# Patient Record
Sex: FEMALE | Race: WHITE | NOT HISPANIC OR LATINO | Employment: FULL TIME | URBAN - METROPOLITAN AREA
[De-identification: names, ages, dates, MRNs, and addresses within clinical notes are randomized per-mention and may not be internally consistent; named-entity substitution may affect disease eponyms.]

---

## 2019-07-24 ENCOUNTER — CLINICAL SUPPORT (OUTPATIENT)
Dept: URGENT CARE | Facility: CLINIC | Age: 54
End: 2019-07-24

## 2019-07-24 PROCEDURE — 99499 UNLISTED E&M SERVICE: CPT | Mod: S$GLB,,, | Performed by: EMERGENCY MEDICINE

## 2019-07-24 PROCEDURE — 99499 PR PHYSICAL - DOT/CDL: ICD-10-PCS | Mod: S$GLB,,, | Performed by: EMERGENCY MEDICINE

## 2019-11-14 DIAGNOSIS — Z12.31 ENCOUNTER FOR SCREENING MAMMOGRAM FOR MALIGNANT NEOPLASM OF BREAST: Primary | ICD-10-CM

## 2021-06-28 ENCOUNTER — OCCUPATIONAL HEALTH (OUTPATIENT)
Dept: URGENT CARE | Facility: CLINIC | Age: 56
End: 2021-06-28

## 2021-06-28 PROCEDURE — 99499 UNLISTED E&M SERVICE: CPT | Mod: S$GLB,,, | Performed by: NURSE PRACTITIONER

## 2021-06-28 PROCEDURE — 99499 PR PHYSICAL - DOT/CDL: ICD-10-PCS | Mod: S$GLB,,, | Performed by: NURSE PRACTITIONER

## 2021-09-28 DIAGNOSIS — Z12.31 ENCOUNTER FOR SCREENING MAMMOGRAM FOR MALIGNANT NEOPLASM OF BREAST: Primary | ICD-10-CM

## 2021-09-30 ENCOUNTER — HOSPITAL ENCOUNTER (OUTPATIENT)
Dept: RADIOLOGY | Facility: HOSPITAL | Age: 56
Discharge: HOME | End: 2021-09-30
Attending: FAMILY MEDICINE
Payer: COMMERCIAL

## 2021-09-30 DIAGNOSIS — Z12.31 ENCOUNTER FOR SCREENING MAMMOGRAM FOR MALIGNANT NEOPLASM OF BREAST: ICD-10-CM

## 2021-09-30 PROCEDURE — 77067 SCR MAMMO BI INCL CAD: CPT | Mod: TC,PO

## 2022-09-02 DIAGNOSIS — Z12.31 ENCOUNTER FOR SCREENING MAMMOGRAM FOR MALIGNANT NEOPLASM OF BREAST: Primary | ICD-10-CM

## 2022-10-22 ENCOUNTER — HOSPITAL ENCOUNTER (OUTPATIENT)
Dept: RADIOLOGY | Facility: HOSPITAL | Age: 57
Discharge: HOME | End: 2022-10-22
Attending: INTERNAL MEDICINE
Payer: COMMERCIAL

## 2022-10-22 DIAGNOSIS — Z12.31 ENCOUNTER FOR SCREENING MAMMOGRAM FOR MALIGNANT NEOPLASM OF BREAST: ICD-10-CM

## 2022-10-22 PROCEDURE — 77063 BREAST TOMOSYNTHESIS BI: CPT | Mod: TC,PO

## 2022-10-22 PROCEDURE — 77067 SCR MAMMO BI INCL CAD: CPT | Mod: TC,PO

## 2023-05-22 ENCOUNTER — OCCUPATIONAL HEALTH (OUTPATIENT)
Dept: URGENT CARE | Facility: CLINIC | Age: 58
End: 2023-05-22

## 2023-05-22 DIAGNOSIS — Z00.00 ENCOUNTER FOR PHYSICAL EXAMINATION: Primary | ICD-10-CM

## 2023-05-22 LAB — COLLECTION ONLY: NORMAL

## 2023-05-22 PROCEDURE — 99499 DOT PHYSICAL: ICD-10-PCS | Mod: S$GLB,,, | Performed by: NURSE PRACTITIONER

## 2023-05-22 PROCEDURE — 99499 UNLISTED E&M SERVICE: CPT | Mod: S$GLB,,, | Performed by: NURSE PRACTITIONER

## 2025-01-10 DIAGNOSIS — Z12.31 ENCOUNTER FOR SCREENING MAMMOGRAM FOR MALIGNANT NEOPLASM OF BREAST: Primary | ICD-10-CM

## 2025-04-05 ENCOUNTER — HOSPITAL ENCOUNTER (EMERGENCY)
Facility: HOSPITAL | Age: 60
Discharge: HOME | End: 2025-04-05
Attending: EMERGENCY MEDICINE
Payer: COMMERCIAL

## 2025-04-05 VITALS
WEIGHT: 210 LBS | HEIGHT: 66 IN | DIASTOLIC BLOOD PRESSURE: 67 MMHG | HEART RATE: 73 BPM | RESPIRATION RATE: 18 BRPM | TEMPERATURE: 99 F | OXYGEN SATURATION: 98 % | SYSTOLIC BLOOD PRESSURE: 114 MMHG | BODY MASS INDEX: 33.75 KG/M2

## 2025-04-05 DIAGNOSIS — R04.0 EPISTAXIS: Primary | ICD-10-CM

## 2025-04-05 DIAGNOSIS — J34.89 PAIN OF MAXILLARY SINUS: ICD-10-CM

## 2025-04-05 PROCEDURE — 99283 EMERGENCY DEPT VISIT LOW MDM: CPT | Mod: 25

## 2025-04-05 NOTE — ED PROVIDER NOTES
Encounter Date: 4/5/2025       History     Chief Complaint   Patient presents with    Epistaxis     Nose bleed and headache starting last night.     59-year-old female with a past medical history of scleroderma presents for evaluation of epistaxis.  The patient reports that she has had persistent epistaxis from the left nare, since last night.  She denies any associated trauma,, headache, sinus congestion, chest pain, shortness of breath, or pain.  She does take a baby aspirin daily.  She denies any blood thinner usage.  He has also had an associated elevated blood pressure since last night as well.  She denies any history of hypertension.  There are no alleviating or aggravating factors.      Review of patient's allergies indicates:  No Known Allergies  History reviewed. No pertinent past medical history.  History reviewed. No pertinent surgical history.  No family history on file.  Social History[1]  Review of Systems   Constitutional:  Negative for chills and fever.   HENT:  Positive for nosebleeds. Negative for congestion.    Respiratory:  Negative for cough and shortness of breath.    Cardiovascular:  Negative for chest pain.   Gastrointestinal:  Negative for abdominal pain, nausea and vomiting.   Genitourinary:  Negative for dysuria.   Musculoskeletal:  Negative for gait problem.   Skin:  Negative for color change.   Neurological:  Negative for dizziness and numbness.   Psychiatric/Behavioral:  Negative for agitation.        Physical Exam     Initial Vitals [04/05/25 1328]   BP Pulse Resp Temp SpO2   (!) 164/91 101 20 98.6 °F (37 °C) 97 %      MAP       --         Physical Exam    Nursing note and vitals reviewed.  Constitutional: She appears well-developed and well-nourished.   HENT:   Head: Normocephalic.   Dry blood noted in left naris.  No active bleeding noted.    Maxillary sinus tenderness noted to the right side.   Eyes: EOM are normal. Pupils are equal, round, and reactive to light.   Neck:   Normal  range of motion.  Cardiovascular:  Normal rate and regular rhythm.           Pulmonary/Chest: Breath sounds normal.   Abdominal: Abdomen is soft. Bowel sounds are normal. She exhibits no distension. There is no abdominal tenderness. There is no rebound and no guarding.   Musculoskeletal:         General: Normal range of motion.      Cervical back: Normal range of motion.     Neurological: She is alert and oriented to person, place, and time.   Skin: Skin is warm and dry.   Psychiatric: She has a normal mood and affect.         ED Course   Procedures  Labs Reviewed   COMPREHENSIVE METABOLIC PANEL   CBC W/ AUTO DIFFERENTIAL    Narrative:     The following orders were created for panel order Complete Blood Count (CBC).  Procedure                               Abnormality         Status                     ---------                               -----------         ------                     CBC with Differential[[id number]]                                                        Please view results for these tests on the individual orders.   MAGNESIUM   CBC WITH DIFFERENTIAL          Imaging Results              X-Ray Sinuses Min 3 Views (Final result)  Result time 04/05/25 14:52:05      Final result by Onel Weldon MD (04/05/25 14:52:05)                   Impression:      Suspect mild maxillary sinus disease      Electronically signed by: Onel Weldon MD  Date:    04/05/2025  Time:    14:52               Narrative:    EXAMINATION:  XR SINUSES MIN 3 VIEWS    CLINICAL HISTORY:  Other specified disorders of nose and nasal sinuses    TECHNIQUE:  AP, lateral, and Bolton views of the paranasal sinuses were performed    COMPARISON:  None.    FINDINGS:  There is mild mucosal thickening suspected in the maxillary sinuses.  There may be mild fluid.  The frontal sinuses are clear.  The sphenoid sinus appears clear on the lateral view.  The mastoid air cells are clear.  The orbits are grossly normal.                                        Medications - No data to display  Medical Decision Making  59-year-old female presents for epistaxis.    Initial differential diagnosis included but not limited to anterior epistaxis, posterior epistaxis, and sinus disease.    Amount and/or Complexity of Data Reviewed  Labs: ordered.  Radiology: ordered.    Risk  Risk Details: The patient was emergently evaluated in the emergency department, her evaluation was significant for a middle-age female with dry blood noted in the left naris.  The patient does have some right maxillary sinus tenderness noted.  The patient's x-rays of her sinuses does show some maxillary sinus disease per Radiology.  I did order the patient to get some lab work drawn, because of her epistaxis as well as elevated blood pressure.  The patient eloped from the emergency department prior to getting any lab work drawn.  She was awake, alert, and oriented x4, had capacity making ability to make her own decisions and showed no signs of clinical intoxication when I did examine her here in the emergency department.                                      Clinical Impression:  Final diagnoses:  [J34.89] Pain of maxillary sinus  [R04.0] Epistaxis (Primary)          ED Disposition Condition    Eloped                     [1]         Ismael De La Fuente MD  04/05/25 1782

## 2025-04-05 NOTE — ED NOTES
Patient identifiers for Yanira Sifuentes checked and correct.  Pt presenting in the er with a c/o a nose bleed that has been ongoing since last night. Pt states she thinks her blood pressure was causing the nose bleed. Pt reports not usually having high blood pressure but checked it last night and it was high. Nose bleed controlled at this time   LOC:  Yanira Sifuentes is awake, alert, and aware of environment with an appropriate affect. She is oriented x 3 and speaking appropriately.  APPEARANCE:  She is resting comfortably and in no acute distress. She is clean and well groomed, patient's clothing is properly fastened.  SKIN:  The skin is warm and dry. She has normal skin turgor and moist mucus membranes. Skin is intact; no bruising or breakdown noted.  MUSCULOSKELETAL:  She is moving all extremities well, no obvious deformities noted. Pulses intact.   RESPIRATORY:  Airway is open and patent. Respirations are spontaneous and non-labored with normal effort and rate.  CARDIAC:  She has a normal rate and rhythm. No peripheral edema noted. Capillary refill < 3 seconds.  ABDOMEN:  No distention noted.  Soft and non-tender upon palpation.  NEUROLOGICAL:  PERRL. Facial expression is symmetrical. Hand grasps are equal bilaterally. Normal sensation in all extremities when touched with finger.  Allergies reported:  Review of patient's allergies indicates:  No Known Allergies

## 2025-04-08 ENCOUNTER — HOSPITAL ENCOUNTER (EMERGENCY)
Facility: HOSPITAL | Age: 60
Discharge: HOME | End: 2025-04-08
Attending: EMERGENCY MEDICINE
Payer: COMMERCIAL

## 2025-04-08 VITALS
HEART RATE: 82 BPM | SYSTOLIC BLOOD PRESSURE: 132 MMHG | RESPIRATION RATE: 18 BRPM | WEIGHT: 210 LBS | OXYGEN SATURATION: 99 % | BODY MASS INDEX: 41.23 KG/M2 | HEIGHT: 60 IN | DIASTOLIC BLOOD PRESSURE: 80 MMHG

## 2025-04-08 DIAGNOSIS — R04.0 EPISTAXIS: Primary | ICD-10-CM

## 2025-04-08 LAB
ABSOLUTE EOSINOPHIL (SMH): 0.15 K/UL
ABSOLUTE MONOCYTE (SMH): 1.14 K/UL (ref 0.3–1)
ABSOLUTE NEUTROPHIL COUNT (SMH): 5.7 K/UL (ref 1.8–7.7)
ALBUMIN SERPL-MCNC: 4.5 G/DL (ref 3.5–5.2)
ALP SERPL-CCNC: 112 UNIT/L (ref 55–135)
ALT SERPL-CCNC: 124 UNIT/L (ref 10–44)
ANION GAP (SMH): 13 MMOL/L (ref 8–16)
APTT PPP: 26.5 SECONDS (ref 21–32)
AST SERPL-CCNC: 79 UNIT/L (ref 10–40)
BASOPHILS # BLD AUTO: 0.1 K/UL
BASOPHILS NFR BLD AUTO: 0.9 %
BILIRUB SERPL-MCNC: 0.8 MG/DL (ref 0.1–1)
BUN SERPL-MCNC: 15 MG/DL (ref 6–20)
CALCIUM SERPL-MCNC: 10.2 MG/DL (ref 8.7–10.5)
CHLORIDE SERPL-SCNC: 102 MMOL/L (ref 95–110)
CO2 SERPL-SCNC: 21 MMOL/L (ref 23–29)
CREAT SERPL-MCNC: 0.8 MG/DL (ref 0.5–1.4)
ERYTHROCYTE [DISTWIDTH] IN BLOOD BY AUTOMATED COUNT: 12.6 % (ref 11.5–14.5)
GFR SERPLBLD CREATININE-BSD FMLA CKD-EPI: >60 ML/MIN/1.73/M2
GLUCOSE SERPL-MCNC: 106 MG/DL (ref 70–110)
HCT VFR BLD AUTO: 43.8 % (ref 37–48.5)
HGB BLD-MCNC: 15.1 GM/DL (ref 12–16)
IMM GRANULOCYTES # BLD AUTO: 0.04 K/UL (ref 0–0.04)
IMM GRANULOCYTES NFR BLD AUTO: 0.4 % (ref 0–0.5)
INR PPP: 1 (ref 0.8–1.2)
LIPASE SERPL-CCNC: 11 U/L (ref 4–60)
LYMPHOCYTES # BLD AUTO: 3.91 K/UL (ref 1–4.8)
MCH RBC QN AUTO: 30.6 PG (ref 27–31)
MCHC RBC AUTO-ENTMCNC: 34.5 G/DL (ref 32–36)
MCV RBC AUTO: 89 FL (ref 82–98)
NUCLEATED RBC (/100WBC) (SMH): 0 /100 WBC
PLATELET # BLD AUTO: 376 K/UL (ref 150–450)
PMV BLD AUTO: 10 FL (ref 9.2–12.9)
POTASSIUM SERPL-SCNC: 3.7 MMOL/L (ref 3.5–5.1)
PROT SERPL-MCNC: 7.9 GM/DL (ref 6–8.4)
PROTHROMBIN TIME: 10.9 SECONDS (ref 9–12.5)
RBC # BLD AUTO: 4.94 M/UL (ref 4–5.4)
RELATIVE EOSINOPHIL (SMH): 1.4 % (ref 0–8)
RELATIVE LYMPHOCYTE (SMH): 35.5 % (ref 18–48)
RELATIVE MONOCYTE (SMH): 10.4 % (ref 4–15)
RELATIVE NEUTROPHIL (SMH): 51.4 % (ref 38–73)
SODIUM SERPL-SCNC: 136 MMOL/L (ref 136–145)
WBC # BLD AUTO: 11.01 K/UL (ref 3.9–12.7)

## 2025-04-08 PROCEDURE — 83690 ASSAY OF LIPASE: CPT | Performed by: EMERGENCY MEDICINE

## 2025-04-08 PROCEDURE — 30901 CONTROL OF NOSEBLEED: CPT | Mod: LT

## 2025-04-08 PROCEDURE — 36415 COLL VENOUS BLD VENIPUNCTURE: CPT | Performed by: EMERGENCY MEDICINE

## 2025-04-08 PROCEDURE — 85730 THROMBOPLASTIN TIME PARTIAL: CPT | Performed by: EMERGENCY MEDICINE

## 2025-04-08 PROCEDURE — 99284 EMERGENCY DEPT VISIT MOD MDM: CPT

## 2025-04-08 PROCEDURE — 85025 COMPLETE CBC W/AUTO DIFF WBC: CPT | Performed by: EMERGENCY MEDICINE

## 2025-04-08 PROCEDURE — 96374 THER/PROPH/DIAG INJ IV PUSH: CPT

## 2025-04-08 PROCEDURE — 80053 COMPREHEN METABOLIC PANEL: CPT | Performed by: EMERGENCY MEDICINE

## 2025-04-08 PROCEDURE — 85610 PROTHROMBIN TIME: CPT | Performed by: EMERGENCY MEDICINE

## 2025-04-08 PROCEDURE — 25000003 PHARM REV CODE 250: Performed by: EMERGENCY MEDICINE

## 2025-04-08 PROCEDURE — 63600175 PHARM REV CODE 636 W HCPCS: Performed by: EMERGENCY MEDICINE

## 2025-04-08 PROCEDURE — 25000003 PHARM REV CODE 250

## 2025-04-08 RX ORDER — TRANEXAMIC ACID 100 MG/ML
INJECTION, SOLUTION INTRAVENOUS
Status: DISCONTINUED
Start: 2025-04-08 | End: 2025-04-08 | Stop reason: WASHOUT

## 2025-04-08 RX ORDER — OXYCODONE AND ACETAMINOPHEN 5; 325 MG/1; MG/1
1 TABLET ORAL EVERY 8 HOURS PRN
Qty: 6 TABLET | Refills: 0 | Status: SHIPPED | OUTPATIENT
Start: 2025-04-08 | End: 2025-04-10

## 2025-04-08 RX ORDER — OXYMETAZOLINE HCL 0.05 %
1 SPRAY, NON-AEROSOL (ML) NASAL
Status: COMPLETED | OUTPATIENT
Start: 2025-04-08 | End: 2025-04-08

## 2025-04-08 RX ORDER — OXYCODONE AND ACETAMINOPHEN 5; 325 MG/1; MG/1
1 TABLET ORAL
Refills: 0 | Status: COMPLETED | OUTPATIENT
Start: 2025-04-08 | End: 2025-04-08

## 2025-04-08 RX ORDER — LORAZEPAM 2 MG/ML
1 INJECTION INTRAMUSCULAR
Status: COMPLETED | OUTPATIENT
Start: 2025-04-08 | End: 2025-04-08

## 2025-04-08 RX ADMIN — OXYCODONE HYDROCHLORIDE AND ACETAMINOPHEN 1 TABLET: 5; 325 TABLET ORAL at 07:04

## 2025-04-08 RX ADMIN — OXYMETAZOLINE HYDROCHLORIDE 1 SPRAY: 5 SPRAY NASAL at 05:04

## 2025-04-08 RX ADMIN — LORAZEPAM 1 MG: 2 INJECTION INTRAMUSCULAR at 05:04

## 2025-04-08 NOTE — ED PROVIDER NOTES
Encounter Date: 4/8/2025       History     Chief Complaint   Patient presents with    Epistaxis     Patient c/o nose bleed x 40 min ago, patient had cautery done yesterday.      HPI  59-year-old female with history of epistaxis status post cautery with ENT 1 day ago presents to the ED with epistaxis today.  Patient states that she is having profuse bleeding from her left nostril that is dripping down into back of her throat making her vomit.    Denies fever, chills, chest pain, shortness of breath, abdominal pain this time.    Review of patient's allergies indicates:  No Known Allergies  History reviewed. No pertinent past medical history.  History reviewed. No pertinent surgical history.  No family history on file.  Social History[1]  Review of Systems   Constitutional:  Negative for fever.   HENT:  Positive for nosebleeds. Negative for sore throat.    Respiratory:  Negative for shortness of breath.    Cardiovascular:  Negative for chest pain.   Gastrointestinal:  Negative for nausea.   Genitourinary:  Negative for dysuria.   Musculoskeletal:  Negative for back pain.   Skin:  Negative for rash.   Neurological:  Negative for weakness.   Hematological:  Does not bruise/bleed easily.       Physical Exam     Initial Vitals [04/08/25 1733]   BP Pulse Resp Temp SpO2   (!) 134/96 106 (!) 22 -- 99 %      MAP       --         Physical Exam    Constitutional: She appears well-developed and well-nourished.   HENT:   Head: Normocephalic and atraumatic.   Nose: Epistaxis is observed.   Blood clots in it the nares bilaterally, blood noted in the posterior oropharynx   Eyes: Conjunctivae and EOM are normal. Pupils are equal, round, and reactive to light.   Neck:   Normal range of motion.  Cardiovascular:  Normal rate and regular rhythm.           No murmur heard.  Pulmonary/Chest: Breath sounds normal. No respiratory distress. She has no wheezes.   Abdominal: Abdomen is soft. She exhibits no distension. There is no abdominal  tenderness.   Musculoskeletal:         General: Normal range of motion.      Cervical back: Normal range of motion.     Lymphadenopathy:     She has no cervical adenopathy.   Neurological: She is alert and oriented to person, place, and time.   Skin: Skin is warm and dry. No pallor.         ED Course   Procedures  Labs Reviewed   COMPREHENSIVE METABOLIC PANEL - Abnormal       Result Value    Sodium 136      Potassium 3.7      Chloride 102      CO2 21 (*)     Glucose 106      BUN 15      Creatinine 0.8      Calcium 10.2      Protein Total 7.9      Albumin 4.5      Bilirubin Total 0.8            AST 79 (*)      (*)     Anion Gap 13      eGFR >60     CBC WITH DIFFERENTIAL - Abnormal    WBC 11.01      RBC 4.94      Hgb 15.1      Hct 43.8      MCV 89      MCH 30.6      MCHC 34.5      RDW 12.6      Platelet Count 376      MPV 10.0      Nucleated RBC 0      Neut % 51.4      Lymph % 35.5      Mono % 10.4      Eos % 1.4      Basophil % 0.9      Imm Grans % 0.4      Neut # 5.7      Lymph # 3.91      Mono # 1.14 (*)     Eos # 0.15      Baso # 0.10      Imm Grans # 0.04     LIPASE - Normal    Lipase Level 11     PROTIME-INR - Normal    PT 10.9      INR 1.0     APTT - Normal    PTT 26.5     CBC W/ AUTO DIFFERENTIAL    Narrative:     The following orders were created for panel order CBC W/ AUTO DIFFERENTIAL.  Procedure                               Abnormality         Status                     ---------                               -----------         ------                     CBC with Differential[[id number]]       Abnormal            Final result                 Please view results for these tests on the individual orders.   URINALYSIS, REFLEX TO URINE CULTURE   EXTRA TUBES    Narrative:     The following orders were created for panel order EXTRA TUBES.  Procedure                               Abnormality         Status                     ---------                               -----------         ------                      Lavender Top Hold[[id number]]                               In process                 Gold Top Hold[[id number]]                                   In process                 Pink Top Hold[[id number]]                                   In process                 Pink Top Hold[[id number]]                                   In process                   Please view results for these tests on the individual orders.   LAVENDER TOP HOLD   GOLD TOP HOLD   PINK TOP HOLD   PINK TOP HOLD   EXTRA TUBES    Narrative:     The following orders were created for panel order EXTRA TUBES.  Procedure                               Abnormality         Status                     ---------                               -----------         ------                     Blood Bank Hold[[id number]]                                                            Blood Bank Hold[[id number]]                                                              Please view results for these tests on the individual orders.   BLOOD BANK HOLD   BLOOD BANK HOLD          Imaging Results    None          Medications   phenylephrine HCL 0.5% nasal spray 2 spray (has no administration in time range)   tranexamic acid nebulizer Soln 500 mg (has no administration in time range)   oxyCODONE-acetaminophen 5-325 mg per tablet 1 tablet (has no administration in time range)   LORazepam injection 1 mg (1 mg Intravenous Given 4/8/25 1745)   oxymetazoline 0.05 % nasal spray 1 spray (1 spray Each Nostril Given 4/8/25 1740)     Medical Decision Making  Amount and/or Complexity of Data Reviewed  Labs: ordered. Decision-making details documented in ED Course.    Risk  OTC drugs.  Prescription drug management.               ED Course as of 04/08/25 1914 Tue Apr 08, 2025 1832 Hemoglobin: 15.1 [JH]   1832 Hematocrit: 43.8 [JH]   1900 PTT: 26.5 [JH]   1900 PT: 10.9 [JH]   1900 INR: 1.0 []   1913 Epistaxis controlled with rhino rocket placement.  Patient will follow up  with her ENT specialist tomorrow.  Discussed strict return precautions with patient.  She verbalized understanding prior to discharge. [JH]      ED Course User Index  [JH] Charlotte Plunkett MD          PGY 4 MDM:   59-year-old female history of epistaxis status post cauterization 1 day ago presents to the ED with epistaxis.  Vital signs stable, afebrile upon arrival.  Patient with significant bleeding from her left nostril.  Patient mentating appropriately, heart and lungs are clear bilaterally, abdomen soft nontender nondistended.  Clinical picture most consistent with anterior nosebleed.  Rhino rocket placed at the bedside into the left nare.  Patient also given Afrin.  We will obtain CBC and coags.  See ED course for updates.    Charlotte Plunkett MD  [employer] EM/Pediatrics PGY-4  04/08/2025 6:16 PM\              Clinical Impression:  Final diagnoses:  [R04.0] Epistaxis (Primary)          ED Disposition Condition    Discharge Stable          ED Prescriptions       Medication Sig Dispense Start Date End Date Auth. Provider    oxyCODONE-acetaminophen (PERCOCET) 5-325 mg per tablet Take 1 tablet by mouth every 8 (eight) hours as needed for Pain. 6 tablet 4/8/2025 4/10/2025 Charlotte Plunkett MD          Follow-up Information    None            [1]   Social History  Tobacco Use    Smoking status: Unknown        Charlotte Plunkett MD  Resident  04/08/25 1914       Charlotte Plunkett MD  Resident  04/08/25 1932

## 2025-04-09 ENCOUNTER — HOSPITAL ENCOUNTER (EMERGENCY)
Facility: HOSPITAL | Age: 60
Discharge: HOME | End: 2025-04-09
Attending: EMERGENCY MEDICINE
Payer: COMMERCIAL

## 2025-04-09 VITALS
TEMPERATURE: 98 F | HEART RATE: 85 BPM | RESPIRATION RATE: 17 BRPM | DIASTOLIC BLOOD PRESSURE: 72 MMHG | OXYGEN SATURATION: 97 % | SYSTOLIC BLOOD PRESSURE: 135 MMHG

## 2025-04-09 DIAGNOSIS — R04.0 EPISTAXIS: Primary | ICD-10-CM

## 2025-04-09 PROCEDURE — 99281 EMR DPT VST MAYX REQ PHY/QHP: CPT

## 2025-04-09 RX ORDER — MORPHINE SULFATE 4 MG/ML
4 INJECTION, SOLUTION INTRAMUSCULAR; INTRAVENOUS
Refills: 0 | Status: DISCONTINUED | OUTPATIENT
Start: 2025-04-09 | End: 2025-04-09

## 2025-04-09 RX ORDER — ONDANSETRON HYDROCHLORIDE 2 MG/ML
4 INJECTION, SOLUTION INTRAVENOUS
Status: DISCONTINUED | OUTPATIENT
Start: 2025-04-09 | End: 2025-04-09

## 2025-04-09 RX ORDER — TRANEXAMIC ACID 100 MG/ML
1000 INJECTION, SOLUTION INTRAVENOUS
Status: DISCONTINUED | OUTPATIENT
Start: 2025-04-09 | End: 2025-04-09

## 2025-04-09 RX ORDER — OXYMETAZOLINE HCL 0.05 %
1 SPRAY, NON-AEROSOL (ML) NASAL
Status: DISCONTINUED | OUTPATIENT
Start: 2025-04-09 | End: 2025-04-09

## 2025-04-09 NOTE — ED PROVIDER NOTES
Encounter Date: 4/9/2025       History     Chief Complaint   Patient presents with    Epistaxis     Began at 0600, was her for same problem yesterday, still has rhinorocket on left side. No blood thinners since Friday     59-year-old female presents with epistaxis.  The patient was recently seen here last night for an episode of epistaxis from the left nostril.  The patient has a rhino rocket placed at that time and then went home.  She reports that she woke up around 6:00 a.m. this morning with bleeding from the right nostril.  She reports that she has had intermittent epistaxis since Friday of last week.  She reports being seen by an ENT doctor named Dr. Conner and had a cluster of blood vessels that were cauterized in his office 2 days ago.  She reports stopping her home aspirin last week on Friday as well.  There was no alleviating or aggravating factors.      Review of patient's allergies indicates:  No Known Allergies  No past medical history on file.  No past surgical history on file.  No family history on file.  Social History[1]  Review of Systems   Constitutional:  Negative for chills and fever.   HENT:  Positive for nosebleeds. Negative for congestion.    Respiratory:  Negative for cough and shortness of breath.    Cardiovascular:  Negative for chest pain.   Gastrointestinal:  Negative for abdominal pain, nausea and vomiting.   Genitourinary:  Negative for dysuria.   Musculoskeletal:  Negative for gait problem.   Skin:  Negative for color change.   Neurological:  Negative for dizziness and numbness.   Psychiatric/Behavioral:  Negative for agitation.        Physical Exam     Initial Vitals [04/09/25 0645]   BP Pulse Resp Temp SpO2   122/66 99 17 98.1 °F (36.7 °C) 96 %      MAP       --         Physical Exam    Nursing note and vitals reviewed.  Constitutional: She appears well-developed and well-nourished.   HENT:   Head: Normocephalic.   Large blood clot noted to the right naris.    Rhino rocket in place  to the left naris.   Eyes: EOM are normal. Pupils are equal, round, and reactive to light.   Neck:   Normal range of motion.  Cardiovascular:  Normal rate and regular rhythm.           Pulmonary/Chest: Breath sounds normal.   Abdominal: Abdomen is soft. Bowel sounds are normal. She exhibits no distension. There is no abdominal tenderness. There is no rebound.   Musculoskeletal:         General: Normal range of motion.      Cervical back: Normal range of motion.     Neurological: She is alert and oriented to person, place, and time.   Skin: Skin is warm and dry.   Psychiatric: She has a normal mood and affect.         ED Course   Procedures  Labs Reviewed   HEPATITIS C ANTIBODY   HIV 1 / 2 ANTIBODY   COMPREHENSIVE METABOLIC PANEL   CBC W/ AUTO DIFFERENTIAL    Narrative:     The following orders were created for panel order CBC auto differential.  Procedure                               Abnormality         Status                     ---------                               -----------         ------                     CBC with Differential[[id number]]                                                        Please view results for these tests on the individual orders.   MAGNESIUM   CBC WITH DIFFERENTIAL          Imaging Results    None          Medications   morphine injection 4 mg (has no administration in time range)   ondansetron injection 4 mg (has no administration in time range)   oxymetazoline 0.05 % nasal spray 1 spray (has no administration in time range)   tranexamic acid injection Soln 1,000 mg (has no administration in time range)     Medical Decision Making  59-year-old female presented with epistaxis.    Initial differential diagnosis included but not limited to anterior epistaxis, posterior epistaxis, and anemia.    Amount and/or Complexity of Data Reviewed  Labs: ordered.    Risk  OTC drugs.  Prescription drug management.  Risk Details: The patient was emergently evaluated in the emergency department,  her evaluation was significant for a middle-age female with bleeding noted from the right nostril.  The patient does have a rhino rocket in place to the left nostril.  I have ordered the patient to get lab work done as well as pain medication, IV TXA, and nausea medicine.  I have also ordered the patient to get lab work done.  And was going to contact her ENT doctor, Dr. Conner by phone.  Prior to getting all this done, the patient reports that her bleeding has stopped per the nursing staff.  She reports that she would like to be discharged without anything else being done and will go to Dr. Conner, her ENT's office.  She is stable for discharge at this time.                                      Clinical Impression:  Final diagnoses:  [R04.0] Epistaxis (Primary)          ED Disposition Condition    Discharge Stable          ED Prescriptions    None       Follow-up Information       Follow up With Specialties Details Why Contact Info    Follow-up with your ENT provider                     [1]   Social History  Tobacco Use    Smoking status: Unknown        Ismael De La Fuente MD  04/09/25 0819

## 2025-04-09 NOTE — ED NOTES
Pt refused iv, blood work, medications ordered. She states she wants to go to ENT office. Dr. De La Fuetne informed. Offer for IM pain med. Pt refused. She states rt nare bleeding has stopped. Rhino pack in L nare intact with no outer bleeding noted.

## 2025-04-09 NOTE — DISCHARGE INSTRUCTIONS
Follow-up with your ENT doctor in 1-2 days.  Do not remove the rhino rocket.  Do not blow your nose while the rhino rocket is in place.  Return to emergency department if you have recurrent nosebleeds, chest pain, shortness of breath, vomiting, or any new concerning symptoms.